# Patient Record
Sex: MALE | Race: BLACK OR AFRICAN AMERICAN | NOT HISPANIC OR LATINO | Employment: FULL TIME | URBAN - METROPOLITAN AREA
[De-identification: names, ages, dates, MRNs, and addresses within clinical notes are randomized per-mention and may not be internally consistent; named-entity substitution may affect disease eponyms.]

---

## 2022-01-25 ENCOUNTER — APPOINTMENT (OUTPATIENT)
Dept: GENERAL RADIOLOGY | Facility: HOSPITAL | Age: 47
End: 2022-01-25

## 2022-01-25 ENCOUNTER — HOSPITAL ENCOUNTER (EMERGENCY)
Facility: HOSPITAL | Age: 47
Discharge: HOME | End: 2022-01-25
Attending: EMERGENCY MEDICINE | Admitting: EMERGENCY MEDICINE

## 2022-01-25 VITALS
SYSTOLIC BLOOD PRESSURE: 145 MMHG | OXYGEN SATURATION: 98 % | DIASTOLIC BLOOD PRESSURE: 95 MMHG | HEIGHT: 73 IN | RESPIRATION RATE: 16 BRPM | TEMPERATURE: 97.3 F | BODY MASS INDEX: 30.48 KG/M2 | HEART RATE: 64 BPM | WEIGHT: 230 LBS

## 2022-01-25 DIAGNOSIS — R00.2 PALPITATIONS: ICD-10-CM

## 2022-01-25 DIAGNOSIS — R07.89 ATYPICAL CHEST PAIN: Primary | ICD-10-CM

## 2022-01-25 LAB
ALBUMIN SERPL-MCNC: 4.8 G/DL (ref 3.5–5.2)
ALBUMIN/GLOB SERPL: 2.1 G/DL
ALP SERPL-CCNC: 62 U/L (ref 39–117)
ALT SERPL W P-5'-P-CCNC: 37 U/L (ref 1–41)
ANION GAP SERPL CALCULATED.3IONS-SCNC: 13.7 MMOL/L (ref 5–15)
AST SERPL-CCNC: 35 U/L (ref 1–40)
BASOPHILS # BLD AUTO: 0.05 10*3/MM3 (ref 0–0.2)
BASOPHILS NFR BLD AUTO: 0.8 % (ref 0–1.5)
BILIRUB SERPL-MCNC: 0.5 MG/DL (ref 0–1.2)
BUN SERPL-MCNC: 20 MG/DL (ref 6–20)
BUN/CREAT SERPL: 16.1 (ref 7–25)
CALCIUM SPEC-SCNC: 9.5 MG/DL (ref 8.6–10.5)
CHLORIDE SERPL-SCNC: 102 MMOL/L (ref 98–107)
CO2 SERPL-SCNC: 24.3 MMOL/L (ref 22–29)
CREAT SERPL-MCNC: 1.24 MG/DL (ref 0.76–1.27)
DEPRECATED RDW RBC AUTO: 39.3 FL (ref 37–54)
EOSINOPHIL # BLD AUTO: 0.09 10*3/MM3 (ref 0–0.4)
EOSINOPHIL NFR BLD AUTO: 1.5 % (ref 0.3–6.2)
ERYTHROCYTE [DISTWIDTH] IN BLOOD BY AUTOMATED COUNT: 12.8 % (ref 12.3–15.4)
GFR SERPL CREATININE-BSD FRML MDRD: 76 ML/MIN/1.73
GLOBULIN UR ELPH-MCNC: 2.3 GM/DL
GLUCOSE SERPL-MCNC: 105 MG/DL (ref 65–99)
HCT VFR BLD AUTO: 43.8 % (ref 37.5–51)
HGB BLD-MCNC: 14.6 G/DL (ref 13–17.7)
IMM GRANULOCYTES # BLD AUTO: 0.02 10*3/MM3 (ref 0–0.05)
IMM GRANULOCYTES NFR BLD AUTO: 0.3 % (ref 0–0.5)
LYMPHOCYTES # BLD AUTO: 1.55 10*3/MM3 (ref 0.7–3.1)
LYMPHOCYTES NFR BLD AUTO: 25.7 % (ref 19.6–45.3)
MCH RBC QN AUTO: 28.5 PG (ref 26.6–33)
MCHC RBC AUTO-ENTMCNC: 33.3 G/DL (ref 31.5–35.7)
MCV RBC AUTO: 85.5 FL (ref 79–97)
MONOCYTES # BLD AUTO: 0.45 10*3/MM3 (ref 0.1–0.9)
MONOCYTES NFR BLD AUTO: 7.5 % (ref 5–12)
NEUTROPHILS NFR BLD AUTO: 3.87 10*3/MM3 (ref 1.7–7)
NEUTROPHILS NFR BLD AUTO: 64.2 % (ref 42.7–76)
NRBC BLD AUTO-RTO: 0 /100 WBC (ref 0–0.2)
NT-PROBNP SERPL-MCNC: 9.2 PG/ML (ref 0–450)
PLATELET # BLD AUTO: 264 10*3/MM3 (ref 140–450)
PMV BLD AUTO: 9.6 FL (ref 6–12)
POTASSIUM SERPL-SCNC: 4 MMOL/L (ref 3.5–5.2)
PROT SERPL-MCNC: 7.1 G/DL (ref 6–8.5)
RBC # BLD AUTO: 5.12 10*6/MM3 (ref 4.14–5.8)
SODIUM SERPL-SCNC: 140 MMOL/L (ref 136–145)
TROPONIN T SERPL-MCNC: <0.01 NG/ML (ref 0–0.03)
TROPONIN T SERPL-MCNC: <0.01 NG/ML (ref 0–0.03)
WBC NRBC COR # BLD: 6.03 10*3/MM3 (ref 3.4–10.8)

## 2022-01-25 PROCEDURE — 83880 ASSAY OF NATRIURETIC PEPTIDE: CPT | Performed by: EMERGENCY MEDICINE

## 2022-01-25 PROCEDURE — 80053 COMPREHEN METABOLIC PANEL: CPT | Performed by: EMERGENCY MEDICINE

## 2022-01-25 PROCEDURE — 84484 ASSAY OF TROPONIN QUANT: CPT | Performed by: EMERGENCY MEDICINE

## 2022-01-25 PROCEDURE — 99284 EMERGENCY DEPT VISIT MOD MDM: CPT

## 2022-01-25 PROCEDURE — 93010 ELECTROCARDIOGRAM REPORT: CPT | Performed by: INTERNAL MEDICINE

## 2022-01-25 PROCEDURE — 71046 X-RAY EXAM CHEST 2 VIEWS: CPT

## 2022-01-25 PROCEDURE — 93005 ELECTROCARDIOGRAM TRACING: CPT | Performed by: EMERGENCY MEDICINE

## 2022-01-25 PROCEDURE — 85025 COMPLETE CBC W/AUTO DIFF WBC: CPT | Performed by: EMERGENCY MEDICINE

## 2022-01-25 PROCEDURE — 93005 ELECTROCARDIOGRAM TRACING: CPT

## 2022-01-25 RX ORDER — SODIUM CHLORIDE 0.9 % (FLUSH) 0.9 %
10 SYRINGE (ML) INJECTION AS NEEDED
Status: DISCONTINUED | OUTPATIENT
Start: 2022-01-25 | End: 2022-01-26 | Stop reason: HOSPADM

## 2022-01-26 LAB
QT INTERVAL: 352 MS
QT INTERVAL: 397 MS

## 2022-01-26 NOTE — DISCHARGE INSTRUCTIONS
Follow-up with your cardiologist as scheduled.  Return to the emergency department for worsening or persistent chest pain, shortness of breath, pain radiating to your neck/jaw/arm, nausea, vomiting, fainting, or other concern.

## 2022-01-26 NOTE — ED PROVIDER NOTES
EMERGENCY DEPARTMENT ENCOUNTER    Room Number:  29/29  Date of encounter:  1/27/2022  PCP: Elizabeth March APRN  Historian: Patient     I used full protective equipment while examining this patient.  This includes face mask, gloves and protective eyewear.  I washed my hands before entering the room and immediately upon leaving the room.  Patient was wearing a surgical mask.      HPI:  Chief Complaint: Chest discomfort  A complete HPI/ROS/PMH/PSH/SH/FH are unobtainable due to: None    Context: Cameron Iyer is a 46 y.o. male who presents to the ED c/o chest discomfort since yesterday.  Patient describes a mild squeezing sensation in his left chest.  This is constant but waxing and waning.  It is mild in intensity.  Denies neck pain, jaw pain, arm pain, nausea, vomiting, sweating, or shortness of breath.  Nothing makes his symptoms better or worse.  Patient jogs and rides an exercise bike multiple days a week and has not had any chest pain while doing that.  Also complains of intermittent palpitations and feeling faint.  Denies cough, fever, or syncope.  He was started on Norvasc approximately 1 month ago for hypertension.  States that his blood pressure was doing better so he did not take it for 2 days this past weekend.  He resumed taking it yesterday and took a dose today.  He has a history of hypertension but denies history of hyperlipidemia, CAD, diabetes, tobacco use, PE, DVT, or family history of CAD.  He has an appointment with cardiology next month.  Patient is a .  He reports increased stress recently as his mother passed away several weeks ago.      PAST MEDICAL HISTORY  Active Ambulatory Problems     Diagnosis Date Noted   • No Active Ambulatory Problems     Resolved Ambulatory Problems     Diagnosis Date Noted   • No Resolved Ambulatory Problems     No Additional Past Medical History         PAST SURGICAL HISTORY  History reviewed. No pertinent surgical history.      FAMILY  HISTORY  History reviewed. No pertinent family history.      SOCIAL HISTORY  Social History     Socioeconomic History   • Marital status:    Tobacco Use   • Smoking status: Never Smoker   • Smokeless tobacco: Never Used   Vaping Use   • Vaping Use: Never used   Substance and Sexual Activity   • Alcohol use: Yes     Comment: occasionally         ALLERGIES  Patient has no known allergies.       REVIEW OF SYSTEMS  Review of Systems      All systems have been reviewed and are negative except as as discussed in the HPI    PHYSICAL EXAM    I have reviewed the triage vital signs and nursing notes.    ED Triage Vitals [01/25/22 2006]   Temp Heart Rate Resp BP SpO2   97.3 °F (36.3 °C) 98 18 -- 98 %      Temp src Heart Rate Source Patient Position BP Location FiO2 (%)   Tympanic -- -- -- --       Physical Exam  GENERAL: Awake,, alert, oriented x3.  Well-developed, well-nourished male resting comfortably in no acute distress  HENT: NCAT, nares patent, moist mucous membranes  NECK: supple  EYES: Extraocular muscles intact, no scleral icterus  CV: regular rhythm, regular rate, equal radial pulses bilaterally  RESPIRATORY: normal effort, clear to auscultation bilaterally  ABDOMEN: soft, nontender  MUSCULOSKELETAL: Extremities are nontender and without obvious deformity.  There is normal range of motion in all extremities.  There is no calf tenderness or pedal edema.  Chest wall is nontender.  NEURO: Strength, sensation, and coordination are grossly intact.  Speech and mentation are unremarkable.  No facial droop.  SKIN: warm, dry, no rash  PSYCH: Normal mood and affect      LAB RESULTS  No results found for this or any previous visit (from the past 24 hour(s)).    Ordered the above labs and independently reviewed the results.      RADIOLOGY  No Radiology Exams Resulted Within Past 24 Hours    I ordered the above noted radiological studies. Reviewed by me and discussed with radiologist.  See dictation for official radiology  interpretation.      PROCEDURES  Procedures      MEDICATIONS GIVEN IN ER    Medications - No data to display      PROGRESS, DATA ANALYSIS, CONSULTS, AND MEDICAL DECISION MAKING    All labs have been independently reviewed by me.  All radiology studies have been reviewed by me and discussed with radiologist dictating the report.   EKG's independently viewed and interpreted by me.  I have reviewed the nurse's notes, vital signs, past medical history, and medication list.  Discussion below represents my analysis of pertinent findings related to patient's condition, differential diagnosis, treatment plan and final disposition.    DDx includes but is not limited to acute coronary syndrome, pulmonary embolism, thoracic aortic dissection, pneumonia, pneumothorax, musculoskeletal pain, GERD or esophageal spasm, anxiety, myocarditis/pericarditis, esophageal rupture, pancreatitis.         ED Course as of 01/27/22 2258 Tue Jan 25, 2022 2011 Old records reviewed.  Patient does not have any prior ED visits or admissions here.  Patient had a Holter monitor in November 2018 which showed occasional PVCs and one run of 4 beat nonsustained V. tach.  No SVT.  No pauses.  Patient has an appointment with [place] cardiology next month. [WH]   2011 EKG          EKG time: 2006  Rhythm/Rate: Sinus rhythm, rate 72  P waves and LA: Normal  QRS, axis: Normal axis, anteroseptal Q waves  ST and T waves: Inverted T waves in the inferior leads, there is minimal ST elevation in the anterior leads likely due to early repolarization, no ST depression    Interpreted Contemporaneously by me at 2009, independently viewed  No prior available for comparison    [WH]   2011 Patient is PERC negative. [WH]   2027 HEART SCORE:    History #0  (Highly suspicious 2, Moderately suspicious 1, Slightly or non-suspicious 0)    ECG #1  (Significant ST depression 2,  Nonspecific repol disturbance 1, Normal 0)    Age #1  (> or = 65 2, 46-65 1,  < or = 45 0)    Risk  factors #1  (hypercholesterolemia, HTN, DM, smoking, pos fam hx, obesity)  (> or = to 3 RF 2, 1 or 2 1, No risk factors 0)    Troponin #0  (> or = 3x normal limit 2, 1-3x normal limit 1, < or = Normal limit 0)    HEART Score Key:  Scores 0-3: 0.9-1.7% risk of adverse cardiac event. In the HEART Score study, these patients were discharged (0.99% in the retrospective study, 1.7% in the prospective study)  Scores 4-6: 12-16.6% risk of adverse cardiac event. In the HEART Score study, these patients were admitted to the hospital. (11.6% retrospective, 16.6% prospective)  Scores =7: 50-65% risk of adverse cardiac event. In the HEART Score study, these patients were candidates for early invasive measures. (65.2% retrospective, 50.1% prospective)      This patient's HEART score is 3 (assuming troponin is negative)     [WH]   2059 Chest x-ray is negative acute.  Aorta is mildly tortuous. [WH]   2244 EKG          EKG time: 2243  Rhythm/Rate: Sinus rhythm, rate 63  P waves and TN: TN interval is mildly prolonged  QRS, axis: Normal axis, anteroseptal Q waves  ST and T waves: Nonspecific ST/T wave changes in the inferior leads. There is ST elevation in the anterior leads consistent with early repolarization. No ST depression    Interpreted Contemporaneously by me at 2244, independently viewed  EKG is changed compared to prior EKG done today at 2006. Nonspecific ST/T wave changes in inferior leads are less prominent now.   [WH]   2312 Troponin T: <0.010 [WH]   2312 Patient is resting comfortably.  Test results were discussed with him.  He was advised follow-up with his cardiologist as scheduled.  Return precautions were discussed. [WH]   2315 Patient's chest pain was atypical.  Heart score is 3.  Troponin was negative x2.  Serial EKG showed nonspecific changes but no signs of a STEMI.  He was PERC negative.  Patient has an appointment scheduled with [place] [facility]ology in 2 weeks. [WH]      ED Course User Index  [WH] Emmanuel  Bib WILDER MD       AS OF 22:58 EST VITALS:    BP - 145/95  HR - 64  TEMP - 97.3 °F (36.3 °C) (Tympanic)  O2 SATS - 98%      DIAGNOSIS  Final diagnoses:   Atypical chest pain   Palpitations         DISPOSITION  Discharge    DISCHARGE    Patient discharged in stable condition.    Reviewed implications of results, diagnosis, meds, responsibility to follow up, warning signs and symptoms of possible worsening, potential complications and reasons to return to ER, including pain, shortness of breath, palpitations, dizziness, or other concern..    Patient/Family voiced understanding of above instructions.    Discussed plan for discharge, as there is no emergent indication for admission. Patient referred to primary care provider for BP management due to today's BP. Pt/family is agreeable and understands need for follow up and repeat testing.  Pt is aware that discharge does not mean that nothing is wrong but it indicates no emergency is present that requires admission and they must continue care with follow-up as given below or physician of their choice.     FOLLOW-UP  Elizabeth March, APRN  [address]  [facility] [id number] [phone]          Castro Winn MD  [address] [address]  [phone]    Go in 2 weeks  As scheduled         Medication List      No changes were made to your prescriptions during this visit.           Dictated utilizing Dragon dictation:  Much of this encounter note is an electronic transcription/translation of spoken language to printed text. The electronic translation of spoken language may permit erroneous, or at times, nonsensical words or phrases to be inadvertently transcribed; Although I have reviewed the note for such errors, some may still exist.     Bib Benitez MD  01/27/22 7286

## 2022-01-26 NOTE — ED TRIAGE NOTES
"Pt to ED from home with c/o \"heart racing\" x a few days.  Pt reports recently starting norvasc, missed a few days r/t to BP \"being ok\".  Pt reports L sided squeezing sensation intermittent.    Pt wearing mask, staff wearing appropriate PPE.  "

## 2022-01-26 NOTE — ED NOTES
.RN wearing all appropriate PPE during entire encounter with patient.        Behringer, Catherine, RN  01/25/22 2015

## 2022-09-14 ENCOUNTER — HOSPITAL ENCOUNTER (EMERGENCY)
Facility: HOSPITAL | Age: 47
Discharge: HOME | End: 2022-09-14
Attending: EMERGENCY MEDICINE | Admitting: EMERGENCY MEDICINE

## 2022-09-14 ENCOUNTER — APPOINTMENT (OUTPATIENT)
Dept: GENERAL RADIOLOGY | Facility: HOSPITAL | Age: 47
End: 2022-09-14

## 2022-09-14 VITALS
OXYGEN SATURATION: 95 % | RESPIRATION RATE: 16 BRPM | SYSTOLIC BLOOD PRESSURE: 146 MMHG | DIASTOLIC BLOOD PRESSURE: 102 MMHG | TEMPERATURE: 96.9 F | HEART RATE: 67 BPM

## 2022-09-14 DIAGNOSIS — M19.071 ARTHRITIS OF RIGHT ANKLE: Primary | ICD-10-CM

## 2022-09-14 PROCEDURE — 73610 X-RAY EXAM OF ANKLE: CPT

## 2022-09-14 PROCEDURE — 99282 EMERGENCY DEPT VISIT SF MDM: CPT

## 2022-09-14 NOTE — ED PROVIDER NOTES
EMERGENCY DEPARTMENT ENCOUNTER    Room Number:  08/08  Date of encounter:  9/14/2022  PCP: Elizabeth March APRN  Historian: Patient  Full history not obtainable due to: None    HPI:  Chief Complaint: Ankle pain    Context: Cameron Iyer is a 46 y.o. male with a PMH significant for hypertension who presents to the ED c/o right ankle pain.  The patient states that he started noticing symptoms after starting Norvasc within the past few weeks.  He had no pain initially and only noticed some mild swelling.  After switching to lisinopril with his PCP he started noticed some mild aching discomfort to the right ankle.  He is a  and states that he is on his feet most of the day.  He has not injured himself.  He has not noticed any swelling since removing the Norvasc from his medication regimen.  He has not used any anti-inflammatories or over-the-counter pain medications to control his symptoms.  He denies intense symptoms and only describes a dull ache to the lateral aspect of the ankle which is nonradiating.  Neurovascularly intact distally.      MEDICAL RECORD REVIEW:    Upon review of the medical record it appears the patient was most recently evaluated in the office with his PCP on September 2 for routine medical management      PAST MEDICAL HISTORY    Active Ambulatory Problems     Diagnosis Date Noted   • No Active Ambulatory Problems     Resolved Ambulatory Problems     Diagnosis Date Noted   • No Resolved Ambulatory Problems     No Additional Past Medical History         PAST SURGICAL HISTORY  No past surgical history on file.      FAMILY HISTORY  No family history on file.      SOCIAL HISTORY  Social History     Socioeconomic History   • Marital status:    Tobacco Use   • Smoking status: Never Smoker   • Smokeless tobacco: Never Used   Vaping Use   • Vaping Use: Never used   Substance and Sexual Activity   • Alcohol use: Yes     Comment: occasionally         ALLERGIES  Patient has no known  allergies.        REVIEW OF SYSTEMS    All systems reviewed and marked as negative except as listed in HPI     PHYSICAL EXAM    I have reviewed the triage vital signs and nursing notes.    ED Triage Vitals   Temp Heart Rate Resp BP SpO2   09/14/22 0853 09/14/22 0853 09/14/22 0853 09/14/22 1031 09/14/22 0853   96.9 °F (36.1 °C) 67 16 (!) 146/102 95 %      Temp src Heart Rate Source Patient Position BP Location FiO2 (%)   09/14/22 0853 09/14/22 0853 -- -- --   Tympanic Monitor          Physical Exam  Constitutional:       General: He is not in acute distress.     Appearance: He is well-developed.   HENT:      Head: Normocephalic and atraumatic.   Eyes:      General: No scleral icterus.     Conjunctiva/sclera: Conjunctivae normal.   Neck:      Trachea: No tracheal deviation.   Cardiovascular:      Rate and Rhythm: Normal rate and regular rhythm.   Pulmonary:      Effort: Pulmonary effort is normal.      Breath sounds: Normal breath sounds.   Abdominal:      Palpations: Abdomen is soft.      Tenderness: There is no abdominal tenderness. There is no guarding.   Musculoskeletal:         General: No deformity.      Cervical back: Normal range of motion.      Right ankle: No swelling or deformity. No tenderness. Normal range of motion. Normal pulse.   Lymphadenopathy:      Cervical: No cervical adenopathy.   Skin:     General: Skin is warm and dry.   Neurological:      Mental Status: He is alert and oriented to person, place, and time.   Psychiatric:         Behavior: Behavior normal.         Vital signs and nursing notes reviewed.            LAB RESULTS  No results found for this or any previous visit (from the past 24 hour(s)).    Ordered the above labs and independently reviewed the results.        RADIOLOGY  XR Ankle 3+ View Right    Result Date: 9/14/2022  XR ANKLE 3+ VW RIGHT-  Clinical: Swelling, pain, no known injury  FINDINGS: Tibiotalar alignment is appropriate. Periarticular bone hypertrophy. No osteochondral  defect or bone lesion. No fracture or dislocation seen. Talonavicular joint degeneration. Spur formation at the Achilles insertion.  CONCLUSION: Degenerative change as described above.  This report was finalized on 9/14/2022 10:21 AM by Dr. Jeffrey Lujan M.D.        I ordered the above noted radiological studies. Independently reviewed by me and discussed with radiologist.  See dictation above for official radiology interpretation.      PROCEDURES    Procedures        MEDICATIONS GIVEN IN ER    Medications - No data to display      PROGRESS, DATA ANALYSIS, CONSULTS, AND MEDICAL DECISION MAKING    All labs have been independently reviewed by me.  All radiology studies have been reviewed by me.   EKG's independently reviewed by me.  Discussion below represents my analysis of pertinent findings related to patient's condition, differential diagnosis, treatment plan and final disposition.    DIFFERENTIAL DIAGNOSIS INCLUDE BUT NOT LIMITED TO:     Ankle fracture, ankle contusion, ankle sprain, arthritis         AS OF 11:33 EDT VITALS:    BP - (!) 146/102  HR - 67  TEMP - 96.9 °F (36.1 °C) (Tympanic)  02 SATS - 95%    1134 I rechecked the patient.  I discussed the patient's radiology findings (including all incidental findings), diagnosis, and plan for discharge.  A repeat exam reveals no new worrisome changes from my initial exam findings.  The patient understands that the fact that they are being discharged does not denote that nothing is abnormal, it indicates that no clinical emergency is present and that they must follow-up as directed in order to properly maintain their health.  Follow-up instructions (specifically listed below) and return to ER precautions were given at this time.  I specifically instructed the patient to follow-up with their PCP.  The patient understands and agrees with the plan, and is ready for discharge.  All questions answered.        DIAGNOSIS  Final diagnoses:   Arthritis of right ankle          DISPOSITION  D/c    Pt masked in first look. I wore a surgical mask throughout my encounters with the pt. I performed hand hygiene on entry into the pt room and upon exit.     Dictated utilizing Dragon dictation     Note Disclaimer: At [facility], we believe that sharing information builds trust and better relationships. You are receiving this note because you recently visited [facility]. It is possible you will see health information before a provider has talked with you about it. This kind of information can be easy to misunderstand. To help you fully understand what it means for your health, we urge you to discuss this note with your provider.      Uziel Gilbert PA  09/14/22 1132

## 2022-09-14 NOTE — ED PROVIDER NOTES
MD ATTESTATION NOTE    The MANISHA and I have discussed this patient's history, physical exam, and treatment plan.  I have reviewed the documentation and personally had a face to face interaction with the patient. I affirm the documentation and agree with the treatment and plan.  The attached note describes my personal findings.      I provided a substantive portion of the care of the patient.  I personally performed the physical exam in its entirety, and below are my findings.  For this patient encounter, the patient wore surgical mask, I wore full protective PPE including N95 and eye protection.      Brief HPI: Patient presents for evaluation of right ankle pain.  Patient is  and has been having increasing discomfort in his right ankle for several weeks.  Patient had some swelling and was taken off Norvasc patient continues to have discomfort.  No direct trauma.  No redness.  No fevers.    PHYSICAL EXAM  ED Triage Vitals   Temp Heart Rate Resp BP SpO2   09/14/22 0853 09/14/22 0853 09/14/22 0853 09/14/22 1031 09/14/22 0853   96.9 °F (36.1 °C) 67 16 (!) 146/102 95 %      Temp src Heart Rate Source Patient Position BP Location FiO2 (%)   09/14/22 0853 09/14/22 0853 -- -- --   Tympanic Monitor            GENERAL: no acute distress  HENT: nares patent  EYES: no scleral icterus    RESPIRATORY: normal effort    MUSCULOSKELETAL: no deformity.  Mild tenderness to right ankle  NEURO: alert, moves all extremities, follows commands  PSYCH:  calm, cooperative  SKIN: warm, dry    Vital signs and nursing notes reviewed.        Plan: X-rays show arthritis.  Will refer to orthopedist       Robbie Reynoso MD  09/14/22 7424

## 2022-09-14 NOTE — ED TRIAGE NOTES
Pt states that about 1 month ago he was started on a BP medication that caused swelling and fluid retention to his right ankle. Went back to his PCP and had the medication changed. States that his swelling went down but that he still has pain to the right ankle.     Patient masked at arrival and triage staff wore all appropriate PPE during entire encounter with patient.